# Patient Record
Sex: FEMALE | Race: ASIAN | NOT HISPANIC OR LATINO | URBAN - METROPOLITAN AREA
[De-identification: names, ages, dates, MRNs, and addresses within clinical notes are randomized per-mention and may not be internally consistent; named-entity substitution may affect disease eponyms.]

---

## 2021-04-28 ENCOUNTER — OFFICE VISIT (OUTPATIENT)
Dept: URBAN - METROPOLITAN AREA CLINIC 31 | Facility: CLINIC | Age: 71
End: 2021-04-28

## 2021-04-28 VITALS
WEIGHT: 153 LBS | DIASTOLIC BLOOD PRESSURE: 70 MMHG | SYSTOLIC BLOOD PRESSURE: 140 MMHG | OXYGEN SATURATION: 96 % | HEIGHT: 63 IN | BODY MASS INDEX: 27.11 KG/M2 | HEART RATE: 77 BPM

## 2021-04-28 RX ORDER — LOSARTAN POTASSIUM AND HYDROCHLOROTHIAZIDE 100; 25 MG/1; MG/1
1 TABLET TABLET, FILM COATED ORAL ONCE A DAY
Qty: 30 | Status: ACTIVE | COMMUNITY

## 2021-04-28 RX ORDER — ATORVASTATIN CALCIUM 10 MG/1
1 TABLET TABLET, FILM COATED ORAL ONCE A DAY
Qty: 30 | Status: ACTIVE | COMMUNITY

## 2021-04-28 RX ORDER — MYCOPHENOLATE MOFETIL 500 MG/20ML
AS DIRECTED INJECTION, POWDER, LYOPHILIZED, FOR SOLUTION INTRAVENOUS
Status: ACTIVE | COMMUNITY

## 2021-04-28 RX ORDER — LEVOTHYROXINE SODIUM 0.07 MG/1
1 TABLET IN THE MORNING ON AN EMPTY STOMACH TABLET ORAL ONCE A DAY
Qty: 30 | Status: ACTIVE | COMMUNITY

## 2021-04-28 RX ORDER — METOPROLOL TARTRATE 50 MG/1
1 TABLET WITH FOOD TABLET, FILM COATED ORAL TWICE A DAY
Qty: 60 | Status: ACTIVE | COMMUNITY

## 2021-04-28 RX ORDER — AMLODIPINE BESYLATE 5 MG/1
1 TABLET TABLET ORAL ONCE A DAY
Qty: 30 | Status: ACTIVE | COMMUNITY

## 2021-04-28 RX ORDER — ALENDRONATE SODIUM 70 MG/1
1 TABLET 30 MINUTES BEFORE THE FIRST FOOD, BEVERAGE OR MEDICINE OF THE DAY WITH PLAIN WATER TABLET ORAL
Qty: 4 | Status: ACTIVE | COMMUNITY

## 2021-04-28 RX ORDER — POTASSIUM CHLORIDE 750 MG/1
1 TABLET WITH FOOD TABLET, FILM COATED, EXTENDED RELEASE ORAL TWICE A DAY
Qty: 60 | Status: ACTIVE | COMMUNITY

## 2021-04-28 RX ORDER — PREDNISONE 20 MG/1
1 TABLET TABLET ORAL ONCE A DAY
Qty: 30 | Status: ACTIVE | COMMUNITY

## 2021-04-28 RX ORDER — SITAGLIPTIN 50 MG/1
AS DIRECTED TABLET, FILM COATED ORAL
Status: ACTIVE | COMMUNITY

## 2021-04-28 RX ORDER — OMEPRAZOLE 20 MG/1
1 CAPSULE CAPSULE, DELAYED RELEASE ORAL ONCE A DAY
Qty: 30 CAPSULE | Refills: 2 | OUTPATIENT
Start: 2021-04-28

## 2021-04-28 RX ORDER — METFORMIN ER 500 MG 500 MG/1
1 TABLET WITH EVENING MEAL TABLET ORAL ONCE A DAY
Qty: 30 | Status: ACTIVE | COMMUNITY

## 2021-04-28 RX ORDER — SUCRALFATE 1 G
1 TABLET ON AN EMPTY STOMACH TABLET ORAL TWICE A DAY
Qty: 60 | Refills: 2 | OUTPATIENT
Start: 2021-04-28

## 2021-04-28 NOTE — HPI-MIGRATED HPI
;   ;   ;     Shortness of Breath : Patient presents today for consultation about chest pressure when she coughs, which is described as squeezing sensation.  Onset was about 6 months ago and episodes occur 2 times per day. Patient admits the pressure will last for several minutes. She states that it is located in the middle of chest. Patient denies associated symptoms such as chills, nausea, vomiting, fever. Admits to SOB. Patient also denies odynophagia, Having the sensation of food getting stuck in your throat or chest or behind your breastbone (sternum), Bringing food back up (regurgitation), and heartburn.   Patient admits to having Baium Swallow test done 04/2021 results were NL.;   Blood in stool : 70 y/o female patient presents today for a consultation about blood in stool, which was detected through a recent fecal occult blood test on (01/06/2021) within Drs office later revealed blood in stool..  Symptoms started (12/2020).  Patient denies any associated symptoms of mucus, melena, pruritus ani, rectal pain, heartburn. Admits to some abdominal cramping, bloating, gas.   Patient denies seeing any blood present on tissue and within the toilet bowl. Patient currently admits 2-3 bowel movements per day.   She denies blood, mucus, or melena in stools. Patient states that her stool is yellow in color since (01/06/2021). Patient denies experiencing fecal urgency but unable to have a bowel movement.     Patient admits aggravating factors are spicy foods and alleviating factors are eating white rice. Patient denies to taking medications to help relieve her symptoms.  Patient denies having a colonoscopy/EGD. Patient denies family history of colonic cancer/disease/polyps.   ;   Change in Bowel habits : Patient admits recent onset diarrhea change in bowel habits/varying bowel habits.  Onset was 01/2021.  Patient currently admits 2-3 bowel movements per day.  Stools are semi- formed.  Patient denies blood, mucus, or melena in stools.  Patient states that their previous bowel habits were 1-2 movements per day, with normal and formed stools.  Patient notes that since she's been taking new medications is when she noticed a change in her bowel habits. Patientadmits to some relief of symptoms with probiotics.  Patient denies associated abdominal pains. Admits to bloating, and gas.  When a patient has a bowel movement she does feel like she is completely emptying their bowels.  Patient denies fecal incontinence.  Patient denies recently drinking lake or well water, Patient admits to changes in their medications.    ;

## 2021-04-29 ENCOUNTER — TELEPHONE ENCOUNTER (OUTPATIENT)
Dept: URBAN - METROPOLITAN AREA CLINIC 35 | Facility: CLINIC | Age: 71
End: 2021-04-29

## 2021-05-27 ENCOUNTER — TELEPHONE ENCOUNTER (OUTPATIENT)
Dept: URBAN - METROPOLITAN AREA CLINIC 35 | Facility: CLINIC | Age: 71
End: 2021-05-27

## 2021-06-07 ENCOUNTER — TELEPHONE ENCOUNTER (OUTPATIENT)
Dept: URBAN - METROPOLITAN AREA CLINIC 35 | Facility: CLINIC | Age: 71
End: 2021-06-07

## 2021-06-08 ENCOUNTER — OFFICE VISIT (OUTPATIENT)
Dept: URBAN - METROPOLITAN AREA CLINIC 33 | Facility: CLINIC | Age: 71
End: 2021-06-08

## 2021-06-08 VITALS
BODY MASS INDEX: 26.93 KG/M2 | WEIGHT: 152 LBS | DIASTOLIC BLOOD PRESSURE: 84 MMHG | SYSTOLIC BLOOD PRESSURE: 139 MMHG | HEIGHT: 63 IN

## 2021-06-08 PROBLEM — 79890006 ANOREXIA: Status: ACTIVE | Noted: 2021-06-08

## 2021-06-08 RX ORDER — MYCOPHENOLATE MOFETIL 500 MG/20ML
AS DIRECTED INJECTION, POWDER, LYOPHILIZED, FOR SOLUTION INTRAVENOUS
Status: ACTIVE | COMMUNITY

## 2021-06-08 RX ORDER — METFORMIN ER 500 MG 500 MG/1
1 TABLET WITH EVENING MEAL TABLET ORAL ONCE A DAY
Qty: 30 | Status: ACTIVE | COMMUNITY

## 2021-06-08 RX ORDER — LEVOTHYROXINE SODIUM 0.07 MG/1
1 TABLET IN THE MORNING ON AN EMPTY STOMACH TABLET ORAL ONCE A DAY
Qty: 30 | Status: ACTIVE | COMMUNITY

## 2021-06-08 RX ORDER — ATORVASTATIN CALCIUM 10 MG/1
1 TABLET TABLET, FILM COATED ORAL ONCE A DAY
Qty: 30 | Status: ACTIVE | COMMUNITY

## 2021-06-08 RX ORDER — SITAGLIPTIN 50 MG/1
AS DIRECTED TABLET, FILM COATED ORAL
Status: ACTIVE | COMMUNITY

## 2021-06-08 RX ORDER — PREDNISONE 20 MG/1
1 TABLET TABLET ORAL ONCE A DAY
Qty: 30 | Status: ACTIVE | COMMUNITY

## 2021-06-08 RX ORDER — OMEPRAZOLE 20 MG/1
1 CAPSULE CAPSULE, DELAYED RELEASE ORAL ONCE A DAY
Qty: 30 CAPSULE | Refills: 2 | Status: ACTIVE | COMMUNITY
Start: 2021-04-28

## 2021-06-08 RX ORDER — METOPROLOL TARTRATE 50 MG/1
1 TABLET WITH FOOD TABLET, FILM COATED ORAL TWICE A DAY
Qty: 60 | Status: ACTIVE | COMMUNITY

## 2021-06-08 RX ORDER — AMLODIPINE BESYLATE 5 MG/1
1 TABLET TABLET ORAL ONCE A DAY
Qty: 30 | Status: ACTIVE | COMMUNITY

## 2021-06-08 RX ORDER — LOSARTAN POTASSIUM AND HYDROCHLOROTHIAZIDE 100; 25 MG/1; MG/1
1 TABLET TABLET, FILM COATED ORAL ONCE A DAY
Qty: 30 | Status: ACTIVE | COMMUNITY

## 2021-06-08 RX ORDER — ALENDRONATE SODIUM 70 MG/1
1 TABLET 30 MINUTES BEFORE THE FIRST FOOD, BEVERAGE OR MEDICINE OF THE DAY WITH PLAIN WATER TABLET ORAL
Qty: 4 | Status: ACTIVE | COMMUNITY

## 2021-06-08 RX ORDER — SUCRALFATE 1 G
1 TABLET ON AN EMPTY STOMACH TABLET ORAL TWICE A DAY
Qty: 60 | Refills: 2 | Status: ON HOLD | COMMUNITY
Start: 2021-04-28

## 2021-06-08 RX ORDER — POTASSIUM CHLORIDE 750 MG/1
1 TABLET WITH FOOD TABLET, FILM COATED, EXTENDED RELEASE ORAL TWICE A DAY
Qty: 60 | Status: ON HOLD | COMMUNITY

## 2021-06-08 NOTE — HPI-MIGRATED HPI
;   ;   ;   ;   ;     Early Satiety : 70 y/o male patient presents today with recent onset of early satiety which has been ongoing for 5 months.  Patient notes that she has been taking Prednisone for 5 months and the medication gave her loss of appetite. Patient states that she has stopped the medication 1 month ago but still is experieincing early satiety.  Patient admits occassional cough with some blood, extreme fatigue, dry mouth,  bloating, and gas.   Patient denies to associated symptoms such as nausea, vomiting, belching, dizziness, faintness, diarrhea, fever, and decreased urination.    Patient denies family hx of gastric/esophageal cancer or diseases. Patient denies past EGD which was performed.  Patient's daughter states that patient has stopped Carafate as directed as she did not have any more abdominal/chest pain, but admits continuously taking Omeprazole. ;   Chest pain : Patient's daughter states that patient has stopped Carafate as directed as she did not have any more abdominal/chest pain, but admits continuously taking Omeprazole.     Last visit (04/28/2021) Patient presents today for consultation about chest pressure when she coughs, which is described as squeezing sensation.  Onset was about 6 months ago and episodes occur 2 times per day. Patient admits the pressure will last for several minutes. She states that it is located in the middle of chest. Patient denies associated symptoms such as chills, nausea, vomiting, fever. Admits to SOB. Patient also denies odynophagia, Having the sensation of food getting stuck in your throat or chest or behind your breastbone (sternum), Bringing food back up (regurgitation), and heartburn.   Patient admits to having Baium Swallow test done 04/2021 results were NL.;   Loss of Appetite : 70 y/o female patient admits she has been experiening loss of appetite.  The onset was 5 months ago. Patient notes that she has been taking Prednisone for 5 months and the medication gave her loss of appetite. Patient states that she has stopped the medication 1 month ago but still is experieincing loss of appetite.   Patient admits occassional cough with some blood, extreme fatigue, dry mouth,  bloating, and gas.   Patient denies to associated symptoms such as nausea, vomiting, belching, dizziness, faintness, diarrhea, fever, and decreased urination.    Patient currently admits 1-2 formed bowel movements per day. Patient denies blood, mucus, or melena in stools. Patient admits probiotics has been aiding with her bowel movements.   Patient denies family hx of gastric/esophageal cancer or diseases. Patient denies past EGD which was performed.;   Blood in stool :      Last visit (04/28/2021) 70 y/o female patient presents today for a consultation about blood in stool, which was detected through a recent fecal occult blood test on (01/06/2021) within Drs office later revealed blood in stool..  Symptoms started (12/2020).  Patient denies any associated symptoms of mucus, melena, pruritus ani, rectal pain, heartburn. Admits to some abdominal cramping, bloating, gas.   Patient denies seeing any blood present on tissue and within the toilet bowl. Patient currently admits 2-3 bowel movements per day.   She denies blood, mucus, or melena in stools. Patient states that her stool is yellow in color since (01/06/2021). Patient denies experiencing fecal urgency but unable to have a bowel movement.     Patient admits aggravating factors are spicy foods and alleviating factors are eating white rice. Patient denies to taking medications to help relieve her symptoms.  Patient denies having a colonoscopy/EGD. Patient denies family history of colonic cancer/disease/polyps.;   Change in Bowel habits : Patient denies any changes in bowel habits since her last visit. Patient currently admits 1-2 formed bowel movements per day. Patient denies blood, mucus, or melena in stools. Patient admits probiotics have been aiding with her bowel movements.      Last visit (04/28/2021) Patient admits recent onset diarrhea change in bowel habits/varying bowel habits.  Onset was 01/2021.  Patient currently admits 2-3 bowel movements per day.  Stools are semi- formed.  Patient denies blood, mucus, or melena in stools.  Patient states that their previous bowel habits were 1-2 movements per day, with normal and formed stools.  Patient notes that since she's been taking new medications is when she noticed a change in her bowel habits. Patientadmits to some relief of symptoms with probiotics.  Patient denies associated abdominal pains. Admits to bloating, and gas.  When a patient has a bowel movement she does feel like she is completely emptying their bowels.  Patient denies fecal incontinence.  Patient denies recently drinking lake or well water, Patient admits to changes in their medications.;

## 2021-06-15 ENCOUNTER — TELEPHONE ENCOUNTER (OUTPATIENT)
Dept: URBAN - METROPOLITAN AREA CLINIC 35 | Facility: CLINIC | Age: 71
End: 2021-06-15

## 2021-06-15 RX ORDER — SODIUM SULFATE, MAGNESIUM SULFATE, AND POTASSIUM CHLORIDE 17.75; 2.7; 2.25 G/1; G/1; G/1
12 TABLETS AS DIRECTED TABLET ORAL
Qty: 1 KIT | Refills: 0 | OUTPATIENT
Start: 2021-06-16

## 2021-06-25 ENCOUNTER — TELEPHONE ENCOUNTER (OUTPATIENT)
Dept: URBAN - METROPOLITAN AREA CLINIC 35 | Facility: CLINIC | Age: 71
End: 2021-06-25

## 2021-07-09 ENCOUNTER — OFFICE VISIT (OUTPATIENT)
Dept: URBAN - METROPOLITAN AREA CLINIC 31 | Facility: CLINIC | Age: 71
End: 2021-07-09

## 2021-07-09 VITALS — BODY MASS INDEX: 26.4 KG/M2 | HEIGHT: 63 IN | WEIGHT: 149 LBS

## 2021-07-09 VITALS
WEIGHT: 152 LBS | SYSTOLIC BLOOD PRESSURE: 144 MMHG | HEIGHT: 63 IN | DIASTOLIC BLOOD PRESSURE: 89 MMHG | BODY MASS INDEX: 26.93 KG/M2

## 2021-07-09 PROBLEM — 87522002 IRON DEFICIENCY ANEMIA: Status: ACTIVE | Noted: 2021-06-16

## 2021-07-09 RX ORDER — MYCOPHENOLATE MOFETIL 500 MG/20ML
AS DIRECTED INJECTION, POWDER, LYOPHILIZED, FOR SOLUTION INTRAVENOUS
Status: ACTIVE | COMMUNITY

## 2021-07-09 RX ORDER — POTASSIUM CHLORIDE 750 MG/1
1 TABLET WITH FOOD TABLET, FILM COATED, EXTENDED RELEASE ORAL TWICE A DAY
Qty: 60 | Status: ON HOLD | COMMUNITY

## 2021-07-09 RX ORDER — OMEPRAZOLE 40 MG/1
1 CAPSULE CAPSULE, DELAYED RELEASE ORAL
Qty: 90 | Refills: 1 | OUTPATIENT
Start: 2021-07-09

## 2021-07-09 RX ORDER — SUCRALFATE 1 G
1 TABLET ON AN EMPTY STOMACH TABLET ORAL TWICE A DAY
Qty: 60 | Refills: 2 | Status: ON HOLD | COMMUNITY
Start: 2021-04-28

## 2021-07-09 RX ORDER — SODIUM SULFATE, MAGNESIUM SULFATE, AND POTASSIUM CHLORIDE 17.75; 2.7; 2.25 G/1; G/1; G/1
12 TABLETS AS DIRECTED TABLET ORAL
Qty: 1 KIT | Refills: 0 | Status: ON HOLD | COMMUNITY
Start: 2021-06-16

## 2021-07-09 RX ORDER — ALENDRONATE SODIUM 70 MG/1
1 TABLET 30 MINUTES BEFORE THE FIRST FOOD, BEVERAGE OR MEDICINE OF THE DAY WITH PLAIN WATER TABLET ORAL
Qty: 4 | Status: ACTIVE | COMMUNITY

## 2021-07-09 RX ORDER — PREDNISONE 20 MG/1
1 TABLET TABLET ORAL ONCE A DAY
Qty: 30 | Status: ON HOLD | COMMUNITY

## 2021-07-09 RX ORDER — ATORVASTATIN CALCIUM 10 MG/1
1 TABLET TABLET, FILM COATED ORAL ONCE A DAY
Qty: 30 | Status: ACTIVE | COMMUNITY

## 2021-07-09 RX ORDER — LOSARTAN POTASSIUM AND HYDROCHLOROTHIAZIDE 100; 25 MG/1; MG/1
1 TABLET TABLET, FILM COATED ORAL ONCE A DAY
Qty: 30 | Status: ACTIVE | COMMUNITY

## 2021-07-09 RX ORDER — AMLODIPINE BESYLATE 5 MG/1
1 TABLET TABLET ORAL ONCE A DAY
Qty: 30 | Status: ACTIVE | COMMUNITY

## 2021-07-09 RX ORDER — LEVOTHYROXINE SODIUM 0.07 MG/1
1 TABLET IN THE MORNING ON AN EMPTY STOMACH TABLET ORAL ONCE A DAY
Qty: 30 | Status: ACTIVE | COMMUNITY

## 2021-07-09 RX ORDER — OMEPRAZOLE 20 MG/1
1 CAPSULE CAPSULE, DELAYED RELEASE ORAL ONCE A DAY
Qty: 30 CAPSULE | Refills: 2 | Status: ACTIVE | COMMUNITY
Start: 2021-04-28

## 2021-07-09 RX ORDER — SITAGLIPTIN 50 MG/1
AS DIRECTED TABLET, FILM COATED ORAL
Status: ACTIVE | COMMUNITY

## 2021-07-09 RX ORDER — METOPROLOL TARTRATE 50 MG/1
1 TABLET WITH FOOD TABLET, FILM COATED ORAL TWICE A DAY
Qty: 60 | Status: ACTIVE | COMMUNITY

## 2021-07-09 RX ORDER — METFORMIN ER 500 MG 500 MG/1
1 TABLET WITH EVENING MEAL TABLET ORAL ONCE A DAY
Qty: 30 | Status: ACTIVE | COMMUNITY

## 2021-07-09 NOTE — HPI-MIGRATED HPI
;   ;   ;   ;   ;     Early Satiety : No current issues.      Last visit 06/08/2021 72 y/o male patient presents today with recent onset of early satiety which has been ongoing for 5 months.  Patient notes that she has been taking Prednisone for 5 months and the medication gave her loss of appetite. Patient states that she has stopped the medication 1 month ago but still is experieincing early satiety.  Patient admits occassional cough with some blood, extreme fatigue, dry mouth,  bloating, and gas.   Patient denies to associated symptoms such as nausea, vomiting, belching, dizziness, faintness, diarrhea, fever, and decreased urination.    Patient denies family hx of gastric/esophageal cancer or diseases. Patient denies past EGD which was performed.  Patient's daughter states that patient has stopped Carafate as directed as she did not have any more abdominal/chest pain, but admits continuously taking Omeprazole.;   Chest pain : No current episodes of chest pain.        Last visit 06/08/2021 Patient's daughter states that patient has stopped Carafate as directed as she did not have any more abdominal/chest pain, but admits continuously taking Omeprazole.     Last visit (04/28/2021) Patient presents today for consultation about chest pressure when she coughs, which is described as squeezing sensation.  Onset was about 6 months ago and episodes occur 2 times per day. Patient admits the pressure will last for several minutes. She states that it is located in the middle of chest. Patient denies associated symptoms such as chills, nausea, vomiting, fever. Admits to SOB. Patient also denies odynophagia, Having the sensation of food getting stuck in your throat or chest or behind your breastbone (sternum), Bringing food back up (regurgitation), and heartburn.   Patient admits to having Barium Swallow test done 04/2021 results were NL.;   Loss of Appetite : 71 year old female presents today via televisit, with consent to, for a consult to switch providers and discuss current symptoms and medical issues. Patient was found to have heme positive stools with subsequent drop on HGB.  Patient currently has had some improvement in her appetite, about 40 %.  She is able to take her time and complete her meals. Patient is able to completely eat three meal and some snacks in between a day. She is not currently having any pain or any GI complains. Currently has 1-2 BM a day, stools are formed with no blood, mucus or melena. She is currently on iron supplements so her stools were a little dark for a few days but not now.       Per last MD note; Patient has severe interstitial fibrosis with hypoxia so high risk for procedures. Patient is transferring to my care and prior plan was for colonoscopy due to heme positive stool and a drop on HGB.  Patient's Pulmonary MD, Dr. Joshua, reached to us to discuss her case. Patient has severe pulmonary fibrosis on home O2.    Last visit 06/08/2021 72 y/o female patient admits she has been experiencing loss of appetite.  The onset was 5 months ago. Patient notes that she has been taking Prednisone for 5 months and the medication gave her loss of appetite. Patient states that she has stopped the medication 1 month ago but still is experiencing loss of appetite.   Patient admits occasional cough with some blood, extreme fatigue, dry mouth,  bloating, and gas.   Patient denies to associated symptoms such as nausea, vomiting, belching, dizziness, faintness, diarrhea, fever, and decreased urination.    Patient currently admits 1-2 formed bowel movements per day. Patient denies blood, mucus, or melena in stools. Patient admits probiotics has been aiding with her bowel movements.   Patient denies family hx of gastric/esophageal cancer or diseases. Patient denies past EGD which was performed.;   Blood in stool : No current episodes of blood in stool.      Last visit (04/28/2021) 72 y/o female patient presents today for a consultation about blood in stool, which was detected through a recent fecal occult blood test on (01/06/2021) within Drs office later revealed blood in stool..  Symptoms started (12/2020).  Patient denies any associated symptoms of mucus, melena, pruritus ani, rectal pain, heartburn. Admits to some abdominal cramping, bloating, gas.   Patient denies seeing any blood present on tissue and within the toilet bowl. Patient currently admits 2-3 bowel movements per day.   She denies blood, mucus, or melena in stools. Patient states that her stool is yellow in color since (01/06/2021). Patient denies experiencing fecal urgency but unable to have a bowel movement.     Patient admits aggravating factors are spicy foods and alleviating factors are eating white rice. Patient denies to taking medications to help relieve her symptoms.  Patient denies having a colonoscopy/EGD. Patient denies family history of colonic cancer/disease/polyps.;   Change in Bowel habits : No current issues, bowel habits are back to normal.       Last visit 06/08/2021 Patient denies any changes in bowel habits since her last visit. Patient currently admits 1-2 formed bowel movements per day. Patient denies blood, mucus, or melena in stools. Patient admits probiotics have been aiding with her bowel movements.      Last visit (04/28/2021) Patient admits recent onset diarrhea change in bowel habits/varying bowel habits.  Onset was 01/2021.  Patient currently admits 2-3 bowel movements per day.  Stools are semi- formed.  Patient denies blood, mucus, or melena in stools.  Patient states that their previous bowel habits were 1-2 movements per day, with normal and formed stools.  Patient notes that since she's been taking new medications is when she noticed a change in her bowel habits. Patientadmits to some relief of symptoms with probiotics.  Patient denies associated abdominal pains. Admits to bloating, and gas.  When a patient has a bowel movement she does feel like she is completely emptying their bowels.  Patient denies fecal incontinence.  Patient denies recently drinking lake or well water, Patient admits to changes in their medications.;

## 2021-07-09 NOTE — HPI-MIGRATED HPI
;   ;   ;   ;   ;     Early Satiety : No current issues       Last visit 06/08/2021 72 y/o male patient presents today with recent onset of early satiety which has been ongoing for 5 months.  Patient notes that she has been taking Prednisone for 5 months and the medication gave her loss of appetite. Patient states that she has stopped the medication 1 month ago but still is experieincing early satiety.  Patient admits occassional cough with some blood, extreme fatigue, dry mouth,  bloating, and gas.   Patient denies to associated symptoms such as nausea, vomiting, belching, dizziness, faintness, diarrhea, fever, and decreased urination.    Patient denies family hx of gastric/esophageal cancer or diseases. Patient denies past EGD which was performed.  Patient's daughter states that patient has stopped Carafate as directed as she did not have any more abdominal/chest pain, but admits continuously taking Omeprazole.;   Chest pain : No current episodes of chest pain.        Last visit 06/08/2021 Patient's daughter states that patient has stopped Carafate as directed as she did not have any more abdominal/chest pain, but admits continuously taking Omeprazole.     Last visit (04/28/2021) Patient presents today for consultation about chest pressure when she coughs, which is described as squeezing sensation.  Onset was about 6 months ago and episodes occur 2 times per day. Patient admits the pressure will last for several minutes. She states that it is located in the middle of chest. Patient denies associated symptoms such as chills, nausea, vomiting, fever. Admits to SOB. Patient also denies odynophagia, Having the sensation of food getting stuck in your throat or chest or behind your breastbone (sternum), Bringing food back up (regurgitation), and heartburn.   Patient admits to having Barium Swallow test done 04/2021 results were NL.;   Loss of Appetite : 71 year old female presents today via televisit with consent to for a consult to switch providers and discuss current symptoms. She is currently had some improvement in her appetite of about 40 %. She is able to take her time and complete her meals. She is able to completely eat three meal and some snacks in between a day. She is not currently having any pain or any GI issues. Currently has 1-2 BM a day stools are formed with no blood, mucus or melena. She is currently on iron supplements so her stools were a little dark for a few days.       Per last MD note; Patient has severe interstitial fibrosis with hypoxia so high risk for procedures. Patient is transferring to my care and prior plan was for colonoscopy due to heme positive stool and a drop on HGB.     Last visit 06/08/2021 72 y/o female patient admits she has been experiencing loss of appetite.  The onset was 5 months ago. Patient notes that she has been taking Prednisone for 5 months and the medication gave her loss of appetite. Patient states that she has stopped the medication 1 month ago but still is experiencing loss of appetite.   Patient admits occasional cough with some blood, extreme fatigue, dry mouth,  bloating, and gas.   Patient denies to associated symptoms such as nausea, vomiting, belching, dizziness, faintness, diarrhea, fever, and decreased urination.    Patient currently admits 1-2 formed bowel movements per day. Patient denies blood, mucus, or melena in stools. Patient admits probiotics has been aiding with her bowel movements.   Patient denies family hx of gastric/esophageal cancer or diseases. Patient denies past EGD which was performed.;   Blood in stool : No current episodes of blood in stool.      Last visit (04/28/2021) 72 y/o female patient presents today for a consultation about blood in stool, which was detected through a recent fecal occult blood test on (01/06/2021) within Drs office later revealed blood in stool..  Symptoms started (12/2020).  Patient denies any associated symptoms of mucus, melena, pruritus ani, rectal pain, heartburn. Admits to some abdominal cramping, bloating, gas.   Patient denies seeing any blood present on tissue and within the toilet bowl. Patient currently admits 2-3 bowel movements per day.   She denies blood, mucus, or melena in stools. Patient states that her stool is yellow in color since (01/06/2021). Patient denies experiencing fecal urgency but unable to have a bowel movement.     Patient admits aggravating factors are spicy foods and alleviating factors are eating white rice. Patient denies to taking medications to help relieve her symptoms.  Patient denies having a colonoscopy/EGD. Patient denies family history of colonic cancer/disease/polyps.;   Change in Bowel habits : no current issues bowel habits are back to normal.       Last visit 06/08/2021 Patient denies any changes in bowel habits since her last visit. Patient currently admits 1-2 formed bowel movements per day. Patient denies blood, mucus, or melena in stools. Patient admits probiotics have been aiding with her bowel movements.      Last visit (04/28/2021) Patient admits recent onset diarrhea change in bowel habits/varying bowel habits.  Onset was 01/2021.  Patient currently admits 2-3 bowel movements per day.  Stools are semi- formed.  Patient denies blood, mucus, or melena in stools.  Patient states that their previous bowel habits were 1-2 movements per day, with normal and formed stools.  Patient notes that since she's been taking new medications is when she noticed a change in her bowel habits. Patientadmits to some relief of symptoms with probiotics.  Patient denies associated abdominal pains. Admits to bloating, and gas.  When a patient has a bowel movement she does feel like she is completely emptying their bowels.  Patient denies fecal incontinence.  Patient denies recently drinking lake or well water, Patient admits to changes in their medications.;

## 2021-07-12 ENCOUNTER — TELEPHONE ENCOUNTER (OUTPATIENT)
Dept: URBAN - METROPOLITAN AREA CLINIC 35 | Facility: CLINIC | Age: 71
End: 2021-07-12

## 2021-07-16 ENCOUNTER — TELEPHONE ENCOUNTER (OUTPATIENT)
Dept: URBAN - METROPOLITAN AREA CLINIC 35 | Facility: CLINIC | Age: 71
End: 2021-07-16

## 2021-07-16 RX ORDER — SUCRALFATE 1 G
1 TABLET ON AN EMPTY STOMACH TABLET ORAL
Qty: 84 | Refills: 1 | OUTPATIENT
Start: 2021-04-28

## 2021-07-18 ENCOUNTER — DASHBOARD ENCOUNTERS (OUTPATIENT)
Age: 71
End: 2021-07-18

## 2021-08-02 ENCOUNTER — TELEPHONE ENCOUNTER (OUTPATIENT)
Dept: URBAN - METROPOLITAN AREA CLINIC 35 | Facility: CLINIC | Age: 71
End: 2021-08-02

## 2021-08-09 ENCOUNTER — TELEPHONE ENCOUNTER (OUTPATIENT)
Dept: URBAN - METROPOLITAN AREA CLINIC 35 | Facility: CLINIC | Age: 71
End: 2021-08-09

## 2021-08-09 RX ORDER — AMLODIPINE BESYLATE 5 MG/1
1 TABLET TABLET ORAL ONCE A DAY
Qty: 30 | Status: ACTIVE | COMMUNITY

## 2021-08-09 RX ORDER — MYCOPHENOLATE MOFETIL 500 MG/20ML
AS DIRECTED INJECTION, POWDER, LYOPHILIZED, FOR SOLUTION INTRAVENOUS
Status: ACTIVE | COMMUNITY

## 2021-08-09 RX ORDER — POTASSIUM CHLORIDE 750 MG/1
1 TABLET WITH FOOD TABLET, FILM COATED, EXTENDED RELEASE ORAL TWICE A DAY
Qty: 60 | Status: ON HOLD | COMMUNITY

## 2021-08-09 RX ORDER — ALENDRONATE SODIUM 70 MG/1
1 TABLET 30 MINUTES BEFORE THE FIRST FOOD, BEVERAGE OR MEDICINE OF THE DAY WITH PLAIN WATER TABLET ORAL
Qty: 4 | Status: ACTIVE | COMMUNITY

## 2021-08-09 RX ORDER — SOD SULF/POT CHLORIDE/MAG SULF 1.479 G
AS DIRECTED TABLET ORAL
Qty: 1 KIT | Refills: 0 | OUTPATIENT
Start: 2021-08-09

## 2021-08-09 RX ORDER — METOPROLOL TARTRATE 50 MG/1
1 TABLET WITH FOOD TABLET, FILM COATED ORAL TWICE A DAY
Qty: 60 | Status: ACTIVE | COMMUNITY

## 2021-08-09 RX ORDER — LOSARTAN POTASSIUM AND HYDROCHLOROTHIAZIDE 100; 25 MG/1; MG/1
1 TABLET TABLET, FILM COATED ORAL ONCE A DAY
Qty: 30 | Status: ACTIVE | COMMUNITY

## 2021-08-09 RX ORDER — LEVOTHYROXINE SODIUM 0.07 MG/1
1 TABLET IN THE MORNING ON AN EMPTY STOMACH TABLET ORAL ONCE A DAY
Qty: 30 | Status: ACTIVE | COMMUNITY

## 2021-08-09 RX ORDER — OMEPRAZOLE 20 MG/1
1 CAPSULE CAPSULE, DELAYED RELEASE ORAL ONCE A DAY
Qty: 30 CAPSULE | Refills: 2 | Status: ACTIVE | COMMUNITY
Start: 2021-04-28

## 2021-08-09 RX ORDER — SUCRALFATE 1 G
1 TABLET ON AN EMPTY STOMACH TABLET ORAL
Qty: 84 | Refills: 1 | Status: ACTIVE | COMMUNITY
Start: 2021-04-28

## 2021-08-09 RX ORDER — SITAGLIPTIN 50 MG/1
AS DIRECTED TABLET, FILM COATED ORAL
Status: ACTIVE | COMMUNITY

## 2021-08-09 RX ORDER — OMEPRAZOLE 40 MG/1
1 CAPSULE CAPSULE, DELAYED RELEASE ORAL
Qty: 90 | Refills: 1 | Status: ACTIVE | COMMUNITY
Start: 2021-07-09

## 2021-08-09 RX ORDER — PREDNISONE 20 MG/1
1 TABLET TABLET ORAL ONCE A DAY
Qty: 30 | Status: ON HOLD | COMMUNITY

## 2021-08-09 RX ORDER — SODIUM SULFATE, MAGNESIUM SULFATE, AND POTASSIUM CHLORIDE 17.75; 2.7; 2.25 G/1; G/1; G/1
12 TABLETS AS DIRECTED TABLET ORAL
Qty: 1 KIT | Refills: 0 | Status: ON HOLD | COMMUNITY
Start: 2021-06-16

## 2021-08-09 RX ORDER — METFORMIN ER 500 MG 500 MG/1
1 TABLET WITH EVENING MEAL TABLET ORAL ONCE A DAY
Qty: 30 | Status: ACTIVE | COMMUNITY

## 2021-08-09 RX ORDER — ATORVASTATIN CALCIUM 10 MG/1
1 TABLET TABLET, FILM COATED ORAL ONCE A DAY
Qty: 30 | Status: ACTIVE | COMMUNITY

## 2021-08-18 ENCOUNTER — TELEPHONE ENCOUNTER (OUTPATIENT)
Dept: URBAN - METROPOLITAN AREA CLINIC 35 | Facility: CLINIC | Age: 71
End: 2021-08-18

## 2021-08-24 ENCOUNTER — TELEPHONE ENCOUNTER (OUTPATIENT)
Dept: URBAN - METROPOLITAN AREA CLINIC 35 | Facility: CLINIC | Age: 71
End: 2021-08-24